# Patient Record
Sex: FEMALE
[De-identification: names, ages, dates, MRNs, and addresses within clinical notes are randomized per-mention and may not be internally consistent; named-entity substitution may affect disease eponyms.]

---

## 2018-08-04 ENCOUNTER — HOSPITAL ENCOUNTER (EMERGENCY)
Dept: HOSPITAL 42 - ED | Age: 23
Discharge: HOME | End: 2018-08-04
Payer: SELF-PAY

## 2018-08-04 VITALS — OXYGEN SATURATION: 100 % | TEMPERATURE: 99 F | RESPIRATION RATE: 18 BRPM

## 2018-08-04 VITALS — DIASTOLIC BLOOD PRESSURE: 82 MMHG | HEART RATE: 65 BPM | SYSTOLIC BLOOD PRESSURE: 119 MMHG

## 2018-08-04 VITALS — BODY MASS INDEX: 20.5 KG/M2

## 2018-08-04 DIAGNOSIS — Z11.3: Primary | ICD-10-CM

## 2018-08-04 DIAGNOSIS — N89.8: ICD-10-CM

## 2018-08-04 LAB
APPEARANCE UR: CLEAR
BILIRUB UR-MCNC: NEGATIVE MG/DL
COLOR UR: YELLOW
GLUCOSE UR STRIP-MCNC: NEGATIVE MG/DL
HCG,QUALITATIVE URINE: NEGATIVE
LEUKOCYTE ESTERASE UR-ACNC: NEGATIVE LEU/UL
PH UR STRIP: 6 [PH] (ref 4.7–8)
PROT UR STRIP-MCNC: NEGATIVE MG/DL
RBC # UR STRIP: NEGATIVE /UL
SP GR UR STRIP: >= 1.03 (ref 1–1.03)
UROBILINOGEN UR STRIP-ACNC: 0.2 E.U./DL

## 2018-08-04 NOTE — ED PDOC
Arrival/HPI





- General


Chief Complaint: Female Genitourinary


Time Seen by Provider: 08/04/18 04:19


Historian: Patient





- History of Present Illness


Narrative History of Present Illness (Text): 





08/04/18 04:35


A 23 year old male with no significant past medical history presents to the 

emergency department complaining of white vaginal discharge since 4 days ago. 

Patient reports she is sexually active and has protected sex. Patient reports 

her LNMP was 4 days ago and she is experiencing no current pain. Patient denies 

any fever, chills, chest pain, shortness of breath, nausea, vomiting, diarrhea, 

back pain, neck pain, headache, dizziness, pregnancy, or any other complaints. 





No PMD








Time/Duration: Other (4 days)


Symptom Onset: Gradual


Symptom Course: Unchanged


Activities at Onset: Light


Context: Home





Past Medical History





- Provider Review


Nursing Documentation Reviewed: Yes





- Psychiatric


Hx Substance Use: No





- Anesthesia


Hx Anesthesia: No





Family/Social History





- Physician Review


Nursing Documentation Reviewed: Yes


Family/Social History: Unknown Family HX


Smoking Status: Never Smoked


Hx Alcohol Use: No


Hx Substance Use: No





Allergies/Home Meds


Allergies/Adverse Reactions: 


Allergies





No Known Allergies Allergy (Verified 08/04/18 04:23)


 











Review of Systems





- Physician Review


All systems were reviewed & negative as marked: Yes





- Review of Systems


Constitutional: absent: Fevers, Night Sweats


Respiratory: absent: SOB


Cardiovascular: absent: Chest Pain


Gastrointestinal: absent: Diarrhea, Nausea, Vomiting


Genitourinary Female: Vaginal Discharge (white vaginal discharge).  absent: 

Dysuria, Frequency, Hematuria, Urine Output Changes, Vaginal Bleeding, Other (

Pregnant)


Musculoskeletal: absent: Back Pain, Neck Pain


Neurological: absent: Headache, Dizziness





Physical Exam


Vital Signs











  Temp Pulse Resp BP Pulse Ox


 


 08/04/18 04:45  99.0 F  60  18  120/84  100














- Systems Exam


Head: Present: Atraumatic, Normocephalic


Pupils: Present: PERRL


Extroacular Muscles: Present: EOMI


Conjunctiva: Present: Normal


Mouth: Present: Moist Mucous Membranes


Neck: Present: Normal Range of Motion


Respiratory/Chest: Present: Clear to Auscultation, Good Air Exchange.  No: 

Respiratory Distress, Accessory Muscle Use


Cardiovascular: Present: Regular Rate and Rhythm, Normal S1, S2.  No: Murmurs


Abdomen: No: Tenderness, Distention, Peritoneal Signs


Back: Present: Normal Inspection


Upper Extremity: Present: Normal Inspection.  No: Cyanosis, Edema


Lower Extremity: Present: Normal Inspection.  No: Edema


Neurological: Present: GCS=15, CN II-XII Intact, Speech Normal


Skin: Present: Warm, Dry, Normal Color.  No: Rashes


Psychiatric: Present: Alert, Oriented x 3, Normal Insight, Normal Concentration





Medical Decision Making


ED Course and Treatment: 


std vs vaginitis vs uti


08/04/18 04:40


Impression: 23 year old female with no significant past medical history 

presents to the emergency department complaining of white vaginal discharge. 





Plan:


-- Chlamydia Lab


-- HCG, Qualitative Urine Stat


-- Urinalysis 


-- Reassess and disposition





Prior Visits:


Notes and results from previous visits were reviewed.





Progress Notes:


08/04/18 04:40


Patient refuses to be treated for STD without results of testing. 





08/04/18 05:30


upon dc, pt request fx for flagyl. states h/o of bv, and "her symtpoms feel 

similar". 





- Lab Interpretations


Lab Results: 


 Lab Results





08/04/18 04:30: Urine Color Yellow, Urine Appearance Clear, Urine pH 6.0, Ur 

Specific Gravity >= 1.030, Urine Protein Negative, Urine Glucose (UA) Negative, 

Urine Ketones Negative, Urine Blood Negative, Urine Nitrate Negative, Urine 

Bilirubin Negative, Urine Urobilinogen 0.2, Ur Leukocyte Esterase Negative, 

Urine HCG, Qual Negative











- Scribe Statement


The provider has reviewed the documentation as recorded by the Vero Renee





All medical record entries made by the Vero were at my direction and 

personally dictated by me. I have reviewed the chart and agree that the record 

accurately reflects my personal performance of the history, physical exam, 

medical decision making, and the department course for this patient. I have 

also personally directed, reviewed, and agree with the discharge instructions 

and disposition.








Disposition/Present on Arrival





- Present on Arrival


Any Indicators Present on Arrival: No


History of DVT/PE: No


History of Uncontrolled Diabetes: No


Urinary Catheter: No


History of Decub. Ulcer: No


History Surgical Site Infection Following: None





- Disposition


Have Diagnosis and Disposition been Completed?: Yes


Diagnosis: 


 Screen for STD (sexually transmitted disease), Vaginal discharge





Disposition: HOME/ ROUTINE


Disposition Time: 04:00


Patient Problems: 


 Current Active Problems











Problem Status Onset


 


Screen for STD (sexually transmitted disease) Acute  











Condition: STABLE


Discharge Instructions (ExitCare):  Vulvovaginal Yeast Infection, Bacterial 

Vaginosis, Screening for Sexually Transmitted Infections


Additional Instructions: 


please follow up with your doctor. return to er with worsening symptoms or 

concerns. you will recieve a call if the lab test is positive. 





Prescriptions: 


Metronidazole 500 mg PO BID #14 tablet


Referrals: 


 [facility] [Outside] - Follow up with primary


[facility] [Outside] - Follow up with primary


[facility] [Outside] - Follow up with primary


Dionicio Iglesias DO [Staff Provider] - Follow up with primary


Forms:  [employer] (English)

## 2019-03-22 ENCOUNTER — HOSPITAL ENCOUNTER (EMERGENCY)
Dept: HOSPITAL 42 - ED | Age: 24
Discharge: HOME | End: 2019-03-22
Payer: MEDICAID

## 2019-03-22 VITALS — TEMPERATURE: 97.6 F | OXYGEN SATURATION: 98 % | DIASTOLIC BLOOD PRESSURE: 72 MMHG | SYSTOLIC BLOOD PRESSURE: 118 MMHG

## 2019-03-22 VITALS — HEART RATE: 60 BPM | RESPIRATION RATE: 16 BRPM

## 2019-03-22 VITALS — BODY MASS INDEX: 20.5 KG/M2

## 2019-03-22 DIAGNOSIS — Z20.2: Primary | ICD-10-CM

## 2019-03-22 DIAGNOSIS — S62.634A: ICD-10-CM
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
## 2019-03-22 PROCEDURE — 96372 THER/PROPH/DIAG INJ SC/IM: CPT

## 2019-03-22 PROCEDURE — 87591 N.GONORRHOEAE DNA AMP PROB: CPT

## 2019-03-22 PROCEDURE — 73140 X-RAY EXAM OF FINGER(S): CPT

## 2019-03-22 PROCEDURE — 87491 CHLMYD TRACH DNA AMP PROBE: CPT

## 2019-03-22 PROCEDURE — 99284 EMERGENCY DEPT VISIT MOD MDM: CPT

## 2019-03-22 NOTE — RAD
Date of service: 



03/22/2019



PROCEDURE:  Right ring finger radiographs.



HISTORY:

s/p injury 1 week ago - diagnosed with fx



COMPARISON:

None.



TECHNIQUE:

AP radiograph of the right hand, as well as spot oblique and lateral 

images of ring finger were obtained.



FINDINGS:



RIGHT RING FINGER:

Distal tuft fracture.  Remainder of the right hand (as seen on the AP 

view) grossly unremarkable.



JOINTS:

Normal. 



SOFT TISSUES:

Soft tissue swelling attests to the acuity of the fracture. No 

visualized radiopaque foreign body.



OTHER FINDINGS:

None.



IMPRESSION:

Acute fracture distal tuft 4th digit.
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
